# Patient Record
Sex: MALE | Race: WHITE
[De-identification: names, ages, dates, MRNs, and addresses within clinical notes are randomized per-mention and may not be internally consistent; named-entity substitution may affect disease eponyms.]

---

## 2023-05-06 ENCOUNTER — HOSPITAL ENCOUNTER (EMERGENCY)
Dept: HOSPITAL 47 - EC | Age: 18
Discharge: HOME | End: 2023-05-06
Payer: COMMERCIAL

## 2023-05-06 VITALS — DIASTOLIC BLOOD PRESSURE: 62 MMHG | HEART RATE: 82 BPM | SYSTOLIC BLOOD PRESSURE: 100 MMHG

## 2023-05-06 VITALS — TEMPERATURE: 97.4 F | RESPIRATION RATE: 16 BRPM
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
## 2023-05-06 DIAGNOSIS — S00.93XA: Primary | ICD-10-CM

## 2023-05-06 DIAGNOSIS — F10.129: ICD-10-CM

## 2023-05-06 LAB
ALBUMIN SERPL-MCNC: 5.1 G/DL (ref 3.5–5)
ALP SERPL-CCNC: 94 U/L (ref 38–126)
ALT SERPL-CCNC: 51 U/L (ref 4–49)
ANION GAP SERPL CALC-SCNC: 16 MMOL/L
APTT BLD: 25.2 SEC (ref 22–30)
AST SERPL-CCNC: 75 U/L (ref 17–59)
BASOPHILS # BLD AUTO: 0 K/UL (ref 0–0.2)
BASOPHILS NFR BLD AUTO: 0 %
BUN SERPL-SCNC: 11 MG/DL (ref 9–20)
CALCIUM SPEC-MCNC: 9.4 MG/DL (ref 8.4–10.2)
CHLORIDE SERPL-SCNC: 104 MMOL/L (ref 98–107)
CO2 SERPL-SCNC: 23 MMOL/L (ref 22–30)
EOSINOPHIL # BLD AUTO: 0.1 K/UL (ref 0–0.7)
EOSINOPHIL NFR BLD AUTO: 1 %
ERYTHROCYTE [DISTWIDTH] IN BLOOD BY AUTOMATED COUNT: 5.12 M/UL (ref 4.3–5.9)
ERYTHROCYTE [DISTWIDTH] IN BLOOD: 12.3 % (ref 11.5–15.5)
GLUCOSE BLD-MCNC: 94 MG/DL (ref 70–110)
GLUCOSE SERPL-MCNC: 111 MG/DL (ref 74–99)
HCT VFR BLD AUTO: 46.9 % (ref 39–53)
HGB BLD-MCNC: 15.3 GM/DL (ref 13–17.5)
INR PPP: 1 (ref ?–1.2)
LYMPHOCYTES # SPEC AUTO: 1.7 K/UL (ref 1–4.8)
LYMPHOCYTES NFR SPEC AUTO: 15 %
MCH RBC QN AUTO: 29.9 PG (ref 25–35)
MCHC RBC AUTO-ENTMCNC: 32.6 G/DL (ref 31–37)
MCV RBC AUTO: 91.7 FL (ref 80–100)
MONOCYTES # BLD AUTO: 0.7 K/UL (ref 0–1)
MONOCYTES NFR BLD AUTO: 6 %
NEUTROPHILS # BLD AUTO: 9 K/UL (ref 1.3–7.7)
NEUTROPHILS NFR BLD AUTO: 78 %
PLATELET # BLD AUTO: 275 K/UL (ref 150–450)
POTASSIUM SERPL-SCNC: 3.9 MMOL/L (ref 3.5–5.1)
PROT SERPL-MCNC: 7.9 G/DL (ref 6.3–8.2)
PT BLD: 10.9 SEC (ref 9–12)
SODIUM SERPL-SCNC: 143 MMOL/L (ref 137–145)
WBC # BLD AUTO: 11.6 K/UL (ref 4–11)

## 2023-05-06 PROCEDURE — 80053 COMPREHEN METABOLIC PANEL: CPT

## 2023-05-06 PROCEDURE — 70450 CT HEAD/BRAIN W/O DYE: CPT

## 2023-05-06 PROCEDURE — 86900 BLOOD TYPING SEROLOGIC ABO: CPT

## 2023-05-06 PROCEDURE — 86901 BLOOD TYPING SEROLOGIC RH(D): CPT

## 2023-05-06 PROCEDURE — 36415 COLL VENOUS BLD VENIPUNCTURE: CPT

## 2023-05-06 PROCEDURE — 72125 CT NECK SPINE W/O DYE: CPT

## 2023-05-06 PROCEDURE — 74177 CT ABD & PELVIS W/CONTRAST: CPT

## 2023-05-06 PROCEDURE — 84484 ASSAY OF TROPONIN QUANT: CPT

## 2023-05-06 PROCEDURE — 93005 ELECTROCARDIOGRAM TRACING: CPT

## 2023-05-06 PROCEDURE — 83605 ASSAY OF LACTIC ACID: CPT

## 2023-05-06 PROCEDURE — 99285 EMERGENCY DEPT VISIT HI MDM: CPT

## 2023-05-06 PROCEDURE — 86850 RBC ANTIBODY SCREEN: CPT

## 2023-05-06 PROCEDURE — 71045 X-RAY EXAM CHEST 1 VIEW: CPT

## 2023-05-06 PROCEDURE — 85610 PROTHROMBIN TIME: CPT

## 2023-05-06 PROCEDURE — 71260 CT THORAX DX C+: CPT

## 2023-05-06 PROCEDURE — 72170 X-RAY EXAM OF PELVIS: CPT

## 2023-05-06 PROCEDURE — 80320 DRUG SCREEN QUANTALCOHOLS: CPT

## 2023-05-06 PROCEDURE — 85025 COMPLETE CBC W/AUTO DIFF WBC: CPT

## 2023-05-06 PROCEDURE — 96374 THER/PROPH/DIAG INJ IV PUSH: CPT

## 2023-05-06 PROCEDURE — 85730 THROMBOPLASTIN TIME PARTIAL: CPT

## 2023-05-06 NOTE — XR
EXAM:

  XR Pelvis, 1 or 2 Views

 

CLINICAL HISTORY:

  ITS.REASON XR Reason: Trauma

 

TECHNIQUE:

  Frontal view of the pelvis.

 

COMPARISON:

  No relevant prior studies available.

 

FINDINGS:

  Bones/joints:  Unremarkable.  No acute fracture.  No dislocation.

  Soft tissues:  Unremarkable.

 

IMPRESSION:     

  Normal pelvis x-ray.
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
## 2023-05-06 NOTE — CT
EXAM:

  CT Head Without Intravenous Contrast

 

CLINICAL HISTORY:

  ITS.REASON CT Reason: trauma

 

TECHNIQUE:

  Axial computed tomography images of the head/brain without intravenous 

contrast.  CTDI is 45. mGy and DLP is 1102 mGy-cm.  This CT exam was 

performed using one or more of the following dose reduction techniques: 

automated exposure control, adjustment of the mA and/or kV according to 

patient size, and/or use of iterative reconstruction technique.

 

COMPARISON:

  No relevant prior studies available.

 

FINDINGS:

  Brain:  Unremarkable.  No hemorrhage.  No significant white matter 

disease.  No edema.

  Ventricles:  Unremarkable.  No ventriculomegaly.

  Bones/joints:  Unremarkable.  No acute fracture.

  Soft tissues:  Unremarkable.

  Sinuses:  Unremarkable as visualized.  No acute sinusitis.

  Mastoid air cells:  Unremarkable as visualized.  No mastoid effusion.

 

IMPRESSION:     

  Normal head/brain CT.

 

_______________________________________________

 

EXAM:

  CT Cervical Spine Without Intravenous Contrast

 

CLINICAL HISTORY:

  ITS.REASON CT Reason: trauma

 

TECHNIQUE:

  Axial computed tomography images of the cervical spine without 

intravenous contrast.  CTDI is 9.9 mGy and DLP is 321.9 mGy-cm.  This CT 

exam was performed using one or more of the following dose reduction 

techniques: automated exposure control, adjustment of the mA and/or kV 

according to patient size, and/or use of iterative reconstruction 

technique.

 

COMPARISON:

  No relevant prior studies available.

 

FINDINGS:

  Vertebrae:  Unremarkable.  No acute fracture.

  Discs/spinal canal/neural foramina:  No acute findings.  No spinal 

canal stenosis.

  Soft tissues:  Unremarkable.

 

IMPRESSION:     

  Normal cervical spine CT.

## 2023-05-06 NOTE — CT
EXAM:

  CT Chest With Intravenous Contrast

 

CLINICAL HISTORY:

  ITS.REASON CT Reason: trauma

 

TECHNIQUE:

  Axial computed tomography images of the chest with intravenous contrast.

  CTDI is 6.4 mGy and DLP is 500.1 mGy-cm.  This CT exam was performed 

using one or more of the following dose reduction techniques: automated 

exposure control, adjustment of the mA and/or kV according to patient 

size, and/or use of iterative reconstruction technique.

 

COMPARISON:

  No relevant prior studies available.

 

FINDINGS:

  Lungs:  Unremarkable.  No mass.  No consolidation.

  Pleural space:  Unremarkable.  No pneumothorax.  No significant 

effusion.

  Heart:  Unremarkable.  No cardiomegaly.  No significant pericardial 

effusion.  No significant coronary artery calcifications.

  Bones/joints:  Unremarkable.  No acute fracture.  No dislocation.

  Soft tissues:  Unremarkable.

  Vasculature:  Unremarkable.  No thoracic aortic aneurysm.

  Lymph nodes:  Unremarkable.  No enlarged lymph nodes.

 

IMPRESSION:     

  Normal chest CT.

 

_______________________________________________

 

EXAM:

  CT Abdomen and Pelvis With Intravenous Contrast

 

CLINICAL HISTORY:

  ITS.REASON CT Reason: trauma

 

TECHNIQUE:

  Axial computed tomography images of the abdomen and pelvis with 

intravenous contrast.  CTDI is 6 mGy and DLP is 349.3 mGy-cm.  This CT 

exam was performed using one or more of the following dose reduction 

techniques: automated exposure control, adjustment of the mA and/or kV 

according to patient size, and/or use of iterative reconstruction 

technique.

 

COMPARISON:

  No relevant prior studies available.

 

FINDINGS:

  Lung bases:  Unremarkable.  No mass.  No consolidation.

 

 ABDOMEN:

  Liver:  Unremarkable.  No mass.

  Gallbladder and bile ducts:  Unremarkable.  No calcified stones.  No 

ductal dilation.

  Pancreas:  Unremarkable.  No mass.  No ductal dilation.

  Spleen:  Unremarkable.  No splenomegaly.

  Adrenals:  Unremarkable.  No mass.

  Kidneys and ureters:  Unremarkable.  No solid mass.  No hydronephrosis.

  Stomach and bowel:  Unremarkable.  No obstruction.  No mucosal 

thickening.

 

 PELVIS:

  Appendix:  No findings to suggest acute appendicitis.

  Bladder:  Unremarkable.  No mass.

  Reproductive:  Unremarkable as visualized.

 

 ABDOMEN and PELVIS:

  Intraperitoneal space:  Unremarkable.  No free air.  No significant 

fluid collection.

  Bones/joints:  No acute fracture.  No dislocation.

  Soft tissues:  Unremarkable.

  Vasculature:  Unremarkable.  No abdominal aortic aneurysm.

  Lymph nodes:  Unremarkable.  No enlarged lymph nodes.

 

IMPRESSION:     

  Normal abdomen and pelvis CT.